# Patient Record
Sex: FEMALE | Race: WHITE
[De-identification: names, ages, dates, MRNs, and addresses within clinical notes are randomized per-mention and may not be internally consistent; named-entity substitution may affect disease eponyms.]

---

## 2019-06-14 ENCOUNTER — HOSPITAL ENCOUNTER (EMERGENCY)
Dept: HOSPITAL 59 - ER | Age: 64
Discharge: HOME | End: 2019-06-14
Payer: MEDICARE

## 2019-06-14 DIAGNOSIS — R51: ICD-10-CM

## 2019-06-14 DIAGNOSIS — Z87.891: ICD-10-CM

## 2019-06-14 DIAGNOSIS — I10: Primary | ICD-10-CM

## 2019-06-14 LAB
ABSOLUTE NEUTROPHIL COUNT: 3.83
ANION GAP SERPL CALC-SCNC: 11 MMOL/L (ref 7–16)
BASOPHILS NFR BLD: 1.7 % (ref 0–6)
BUN SERPL-MCNC: 12 MG/DL (ref 8–23)
CO2 SERPL-SCNC: 27 MMOL/L (ref 22–29)
CREAT SERPL-MCNC: 0.9 MG/DL (ref 0.5–0.9)
EOSINOPHIL NFR BLD: 2.6 % (ref 0–6)
ERYTHROCYTE [DISTWIDTH] IN BLOOD BY AUTOMATED COUNT: 14.5 % (ref 11.5–14.5)
EST GLOMERULAR FILTRATION RATE: > 60 ML/MIN
GLUCOSE SERPL-MCNC: 106 MG/DL (ref 74–109)
GRANULOCYTES NFR BLD: 59.8 % (ref 47–80)
HCT VFR BLD CALC: 41 % (ref 35–47)
HGB BLD-MCNC: 12.6 GM/DL (ref 11.6–16)
LYMPHOCYTES NFR BLD AUTO: 26.6 % (ref 16–45)
MCH RBC QN AUTO: 26.7 PG (ref 27–33)
MCHC RBC AUTO-ENTMCNC: 30.7 G/DL (ref 32–36)
MCV RBC AUTO: 86.9 FL (ref 81–97)
MONOCYTES NFR BLD: 9.3 % (ref 0–9)
PLATELET # BLD: 400 K/UL (ref 130–400)
PMV BLD AUTO: 9.3 FL (ref 7.4–10.4)
RBC # BLD AUTO: 4.72 M/UL (ref 3.8–5.4)
WBC # BLD AUTO: 6.4 K/UL (ref 4.2–12.2)

## 2019-06-14 PROCEDURE — 80048 BASIC METABOLIC PNL TOTAL CA: CPT

## 2019-06-14 PROCEDURE — 85025 COMPLETE CBC W/AUTO DIFF WBC: CPT

## 2019-06-14 PROCEDURE — 99284 EMERGENCY DEPT VISIT MOD MDM: CPT

## 2019-06-14 PROCEDURE — 99283 EMERGENCY DEPT VISIT LOW MDM: CPT

## 2019-06-14 PROCEDURE — 70450 CT HEAD/BRAIN W/O DYE: CPT

## 2019-06-14 NOTE — EMERGENCY DEPARTMENT RECORD
History of Present Illness





- General


Chief Complaint: Hypertension


Stated Complaint: HIGH BP/PAIN IN HEAD


Time Seen by Provider: 06/14/19 12:14


Source: Patient


Mode of Arrival: Ambulatory


Limitations: No limitations





- History of Present Illness


Initial Comments: 


62 yo female presents with a concern about her blood pressure and a headache.  

The blood pressure this morning was 160/90.  She normally runs closer to 120/80.

 At 10am she had a few seconds of sharp pain on the top of her head.  The pain 

only lasted about 2 seconds then completely resolved.  It has recurred a few 

times.  It is not severe.  She feels a pressure behind her eyes.  The scalp is 

not sensitive to touch.  No vision loss, speech changes, hearing changes, 

difficulty with finding words, coordination trouble, walking trouble, numbness 

or weakness.  She called her PCP to be seen but was unable to get an 

appointment.  She currently has no pain.  BP on presentation was 130/70.





MD Complaint: Other (elevated blood pressure)


-: Hour(s)


Timing: Awoke with symptoms, Intermittent


Description: Other


History of Same: Yes


History of Trauma: No


Severity: Mild


Improves With: Nothing


Worsens With: Nothing


Associated Symptoms: Denies other symptoms





- Angely Coma Scale


Eye Response: (4) Open spontaneously


Motor Response: (6) Obeys commands


Verbal Response: (5) Oriented


Angely Total: 15





- Symptoms of Stroke


Onset of Symptoms Date: 06/14/19


Onset of Symptoms Time: 09:15


Baseline State: Baseline State





- Related Data


                                Home Medications











 Medication  Instructions  Recorded  Confirmed  Last Taken


 


Aspirin [Adult Aspirin Regimen] 81 mg PO 06/14/19 06/13/19


 


B-Complex with Vitamin C 1 each PO 06/14/19 06/13/19





[B-Complex Plus Vitamin C]    


 


Bupropion HCl [Wellbutrin Xl]  06/14/19 06/13/19


 


Cholecalciferol (Vitamin D3) 5,000 unit PO 06/14/19 06/13/19





[Vitamin D3]    


 


Diltiazem HCl [Cardizem Cd] 180 mg PO 06/14/19 06/13/19


 


Escitalopram Oxalate [Lexapro] 20 mg PO 06/14/19 06/13/19


 


Pantoprazole Sodium [Protonix] 20 mg PO 06/14/19 06/13/19











                                    Allergies











Allergy/AdvReac Type Severity Reaction Status Date / Time


 


indomethacin [From Indocin] Allergy  DIZZINESS Verified 06/14/19 12:01


 


indomethacin sodium Allergy  DIZZINESS Verified 06/14/19 12:01





[From Indocin]     


 


latex Allergy  RASH Verified 06/14/19 12:01














Travel Screening





- Travel/Exposure Within Last 30 Days


Have you traveled within the last 30 days?: No





Review of Systems


Constitutional: Denies: Chills, Fever, Malaise, Weakness


Eyes: Denies: Eye discharge, Eye pain, Photophobia, Vision change


ENT: Denies: Congestion, Throat pain


Respiratory: Denies: Cough, Dyspnea, Hemoptysis, Wheezes


Cardiovascular: Reports: Other (Hx of Afib, no recent palpitations).  Denies: C

hest pain, Palpitations, Syncope


Endocrine: Denies: Fatigue, Polydipsia, Polyuria


Gastrointestinal: Denies: Abdominal pain, Diarrhea, Nausea, Vomiting


Genitourinary: Denies: Dysuria, Urgency


Musculoskeletal: Denies: Arthralgia, Back pain, Joint swelling, Myalgia


Skin: Denies: Bruising, Change in color, Rash


Neurological: Reports: Headache.  Denies: Abnormal gait, Confusion, Numbness, 

Paresthesias, Seizure, Tingling, Tremors, Vertigo, Weakness


Psychiatric: Denies: Anxiety


Hematological/Lymphatic: Denies: Easy bleeding, Easy bruising





Past Medical History





- SOCIAL HISTORY


Smoking Status: Former smoker


Alcohol Use: None


Drug Use: None





- RESPIRATORY


Hx Respiratory Disorders: Yes


Hx Dyspnea: Yes (exertional)





- CARDIOVASCULAR


Hx Cardio Disorders: Yes


Hx Abnormal EKG: Yes (afib x 2 episodes)


Hx Hypertension: Yes (controlled with meds)


Hx Irregular Heartbeat: Yes (hx A-fib-none in 2 years)


Hx Palpitations: Yes (rare)


Comment:: Has had prior stress test and echo-? R/T afib





- NEURO


Hx Neuro Disorders: Yes


Hx Headaches: Yes (daily, helped with chiropractor)





- GI


Hx GI Disorders: Yes


Hx Reflux: Yes (prilosec)


Hx Wt Loss/Wt Gain: Yes (15# wt gain due to inactivity from knee pain)





- ENDOCRINE


Hx Endocrine Disorders: No





- MUSCULOSKELETAL


Hx Musculoskeletal Disorders: Yes


Hx Arthritis: Yes (left knee osteoarthritis)





- PSYCH


Hx Psych Problems: Yes


Hx Anxiety: Yes (Controlled with Lexapro)


Hx Suicide Attempt: No (Ideation in 1982)





- HEMATOLOGY/ONCOLOGY


Hx Hematology/Oncology Disorders: No





Family Medical History


Any Significant Family History?: Yes


Hx Alcohol Use: Children


Hx Diabetes: Brother/Sister


Hx Heart Disease: Father, Mother, Brother/Sister


Hx Resp Disorders: Father, Brother/Sister





Physical Exam





- General


General Appearance: Alert, Oriented x3, Cooperative, No acute distress


Limitations: No limitations





- Head


Head exam: Atraumatic.  negative: Normal inspection





- Eye


Eye exam: Normal appearance, PERRL, EOMI.  negative: Conjunctival injection, 

Nystagmus, Periorbital swelling, Scleral icterus


Pupils: Normal accommodation.  negative: Irregular, Unequal





- ENT


ENT exam: Normal exam, Mucous membranes moist, Normal orophraynx, TM's normal 

bilaterally


Ear exam: Normal external inspection


Nasal Exam: Normal inspection


Mouth exam: Normal external inspection, Tongue normal.  negative: Muffled voice


Teeth exam: Normal inspection


Throat exam: Normal inspection





- Neck


Neck exam: Normal inspection, Full ROM.  negative: Lymphadenopathy





- Respiratory


Respiratory exam: Normal lung sounds bilaterally.  negative: Respiratory 

distress, Rhonchi, Stridor, Wheezes





- Cardiovascular


Cardiovascular Exam: Regular rate, Normal rhythm, Normal heart sounds


Peripheral Pulses: 2+: Radial (R), Radial (L)





- GI/Abdominal


GI/Abdominal exam: Soft.  negative: Tenderness





- Rectal


Rectal exam: Deferred





- 


 exam: Deferred





- Extremities


Extremities exam: Normal inspection.  negative: Pedal edema, Tenderness





- Back


Back exam: Denies: CVA tenderness (R), CVA tenderness (L), Tenderness





- Neurological


Neurological exam: Alert, CN II-XII intact, Normal gait, Oriented X3, Reflexes 

normal, Other (No PND, Normal FTN, Normal SHUKRI, No ataxia, Normal tracking, 

Normal gait).  negative: Abnormal gait, Altered, Motor sensory deficit





- Psychiatric


Psychiatric exam: Normal affect, Normal mood.  negative: Agitated, Anxious





- Skin


Skin exam: Dry, Intact, Normal color, Warm


Description of rash: Other (Non tender scalp)





Course





                                   Vital Signs











  06/14/19





  11:55


 


Temperature 98.2 F


 


Pulse Rate 69


 


Respiratory 18





Rate 


 


Blood Pressure 131/70


 


Pulse Ox 94 L














- Reevaluation(s)


Reevaluation #1: 





06/14/19 13:02


The CBC and BMP were reviewed


No acute abnormality


06/14/19 13:07


The HCT is normal


The symptoms are mild and fleeting with a normal neurologic examination as 

tested


Her BP is not is a significantly elevated state


DC home with close instructions for follow up and reasons to return to the ED





Medical Decision Making





- Lab Data


Result diagrams: 


                                 06/14/19 12:40





                                 06/14/19 12:40





Disposition


Disposition: Discharge


Clinical Impression: 


 Hypertension, Headache





Disposition: Home, Self-Care


Condition: (1) Good


Instructions:  Hypertension (ED)


Additional Instructions: 


Call your doctor for close follow up of today's ER visit


Review the tests and results with your doctor


Return if you have any concerns, new or worsening symptoms


Recheck your blood pressure with your doctor in the next week


Forms:  Patient Portal Access


Time of Disposition: 13:10





Quality





- Quality Measures


Quality Measures: N/A





- Blood Pressure Screening


Does Patient Have Any of the Following: No


Blood Pressure Classification: Pre-Hypertensive BP Reading


Systolic Measurement: 131


Diastolic Measurement: 70


Screening for High Blood Pressure: < Pre-Hypertensive BP, F/U Documented > 

[]


Pre-Hypertensive Follow-up Interventions: Referral to alternative/primary care 

provider.

## 2019-06-17 NOTE — CT SCAN REPORT
EXAM:  CT OF THE HEAD WITHOUT CONTRAST 



HISTORY:  RIGHT SIDED HEADACHE WITHOUT KNOWN INJURY.   



TECHNIQUE:  Standard CT imaging of the head was obtained without IV contrast.  



Comparison:  None.  



Hand dominance:  Unknown.  



FINDINGS:  Generalized sulcal prominence from generalized volume loss.  The 
ventricles are normal in size.  The basal cisterns are maintained.  No 
intracranial hemorrhage or extraaxial fluid collection is identified.  No 
significant mass effect or midline shift.  The gray white matter differentiation
is maintained.  The paranasal sinuses and mastoid air cells are clear.  



IMPRESSION:  

NEGATIVE NONCONTRAST HEAD CT.  



JOB NUMBER:  [id number]

MTDD